# Patient Record
(demographics unavailable — no encounter records)

---

## 2025-01-28 NOTE — PHYSICAL EXAM
[Sclera] : the sclera and conjunctiva were normal [Extraocular Movements] : extraocular movements were intact [Outer Ear] : the ears and nose were normal in appearance [Hearing Threshold Finger Rub Not [place]] : hearing was normal [] : no respiratory distress [Exaggerated Use Of Accessory Muscles For Inspiration] : no accessory muscle use [Arterial Pulses Normal] : the arterial pulses were normal [Edema] : no peripheral edema present [Nondistended] : nondistended [Abdomen Tenderness] : non-tender [Nail Clubbing] : no clubbing  or cyanosis of the fingernails [Normal] : oriented to person, place and time, the affect was normal, the mood was normal and not anxious [Supraclavicular Lymph Nodes Enlarged Bilaterally] : supraclavicular [Axillary Lymph Nodes Enlarged Bilaterally] : axillary [de-identified] : breasts are smooth and supple w/o e/o prior RT effect in the treated right breast; right breast is slightly smaller than the left; dimpling at site of recent biopsy

## 2025-01-28 NOTE — END OF VISIT
[] : Resident [FreeTextEntry3] :   I was present with the resident during the key portions of the history and exam. I discussed the case with the Resident and agree with the findings and plan as documented in the Residents note unless noted below.

## 2025-01-28 NOTE — HISTORY OF PRESENT ILLNESS
[FreeTextEntry1] : Ms. Cortez is a 56-year-old female with recurrent right breast cancer. She has a history of Stage IA right breast cancer (ER/HI+ HER2-) s/p lumpectomy with SLNB and completed WBRT in March 2013. Patient took Tamoxifen for 10 years.   Patient has been getting annual imaging with mammograms and MRI scans.   05/03/24 Bilateral mammogram & ultrasound ([facility]) showed no evidence of malignancy.   12/26/24 Bilateral breasts MRI ([facility]). Right breast showed indeterminant clumped linear non-mass enhancement in the outer right breast. MRI guided core biopsy recommended. No suspicious enhancement in the left breast.   01/17/25 MRI Guided biopsy ([facility]). Pathology revealed a right breast DCIS, intermediate nuclear grade, solid pattern. Surrounding breast tissue showing hyalinized fibroadenomatous nodule, focal sclerosing adenosis and dense stromal fibrosis. ER/HI-   01/28/25 Ms. Cortez presents today to discuss the possibility of reirradiation. She had a follow-up with Dr. Kelley on 1/24/25 and was experiencing new upper breast pain Pt will be having a CT tomorrow to check for any distant recurrence.  She has been seeing Dr Carri Bernabe since 2013 and has an appointment to see her again in April 2025.  Today she is feeling well and offered no new complaints.

## 2025-01-28 NOTE — PHYSICAL EXAM
[Sclera] : the sclera and conjunctiva were normal [Extraocular Movements] : extraocular movements were intact [Outer Ear] : the ears and nose were normal in appearance [Hearing Threshold Finger Rub Not [place]] : hearing was normal [] : no respiratory distress [Exaggerated Use Of Accessory Muscles For Inspiration] : no accessory muscle use [Arterial Pulses Normal] : the arterial pulses were normal [Edema] : no peripheral edema present [Nondistended] : nondistended [Abdomen Tenderness] : non-tender [Nail Clubbing] : no clubbing  or cyanosis of the fingernails [Normal] : oriented to person, place and time, the affect was normal, the mood was normal and not anxious [Supraclavicular Lymph Nodes Enlarged Bilaterally] : supraclavicular [Axillary Lymph Nodes Enlarged Bilaterally] : axillary [de-identified] : breasts are smooth and supple w/o e/o prior RT effect in the treated right breast; right breast is slightly smaller than the left; dimpling at site of recent biopsy

## 2025-02-04 NOTE — REVIEW OF SYSTEMS
[Recent Weight Gain (___ Lbs)] : recent [unfilled] ~Ulb weight gain [Night Sweats] : night sweats [Recent ___ Lb Weight Gain] : recent [unfilled] ~Ulb weight gain [Hot Flashes] : hot flashes

## 2025-02-08 NOTE — CONSULT LETTER
[Dear  ___] : Dear  [unfilled], [Consult Letter:] : I had the pleasure of evaluating your patient, [unfilled]. [Please see my note below.] : Please see my note below. [Consult Closing:] : Thank you very much for allowing me to participate in the care of this patient.  If you have any questions, please do not hesitate to contact me. [Sincerely,] : Sincerely, [FreeTextEntry3] : Sharonda Knowles [place] [address] [DrGustavo  ___] : Dr. LAZARO [DrGustavo ___] : Dr. LAZARO

## 2025-02-08 NOTE — CONSULT LETTER
[Dear  ___] : Dear  [unfilled], [Consult Letter:] : I had the pleasure of evaluating your patient, [unfilled]. [Please see my note below.] : Please see my note below. [Consult Closing:] : Thank you very much for allowing me to participate in the care of this patient.  If you have any questions, please do not hesitate to contact me. [Sincerely,] : Sincerely, [FreeTextEntry3] : Sharonda [place] [address] [DrGustavo  ___] : Dr. LAZARO [DrGustavo ___] : Dr. LAZARO

## 2025-02-08 NOTE — PHYSICAL EXAM
[Normocephalic] : normocephalic [Atraumatic] : atraumatic [Supple] : supple [No Supraclavicular Adenopathy] : no supraclavicular adenopathy [Examined in the supine and seated position] : examined in the supine and seated position [Asymmetrical] : asymmetrical [No dominant masses] : no dominant masses in right breast  [No dominant masses] : no dominant masses left breast [No Nipple Retraction] : no left nipple retraction [No Nipple Discharge] : no left nipple discharge [No Axillary Lymphadenopathy] : no left axillary lymphadenopathy [No Edema] : no edema [No Rashes] : no rashes [No Ulceration] : no ulceration [de-identified] : healed periareolar incision, slight lateral  nipple defect, healed 3:00 radial incision, healed axillary incision, mild brawny induration c/w rtx

## 2025-02-08 NOTE — PHYSICAL EXAM
[Normocephalic] : normocephalic [Atraumatic] : atraumatic [Supple] : supple [No Supraclavicular Adenopathy] : no supraclavicular adenopathy [Examined in the supine and seated position] : examined in the supine and seated position [Asymmetrical] : asymmetrical [No dominant masses] : no dominant masses in right breast  [No dominant masses] : no dominant masses left breast [No Nipple Retraction] : no left nipple retraction [No Nipple Discharge] : no left nipple discharge [No Axillary Lymphadenopathy] : no left axillary lymphadenopathy [No Edema] : no edema [No Rashes] : no rashes [No Ulceration] : no ulceration [de-identified] : healed periareolar incision, slight lateral  nipple defect, healed 3:00 radial incision, healed axillary incision, mild brawny induration c/w rtx

## 2025-02-08 NOTE — HISTORY OF PRESENT ILLNESS
[FreeTextEntry1] : Lorena is a 56-year-old female referred for newly diagnosed ductal carcinoma in situ of the right breast. Her recent bilateral breast MRI done for cancer surveillance (12/27/2024, [facility]). showed an indeterminant clumped linear non-mass enhancement in the right outer breast for which an MRI guided biopsy was recommended. Her most recent screening imaging done on 05/03/2024 ([facility]) showed heterogeneously dense breast tissue with no suspicious findings on neither mammogram nor ultrasound.  Pathology of right 9:00 outer MRI biopsy ([facility], 01/17/2025) showed DCIS, intermediate grade ER negative and WI negative.  Her previous breast history is positive for right invasive ductal carcinoma in 2012. This was an incidental finding of an 8mm IDC on pathology of a right excisional biopsy done for a papilloma. Receptors were ER/[place] positive and her2 negative She underwent a right re-excision partial mastectomy and right sentinel node excision (Dr Jocelynn Guerrier) which showed no residual carcinoma and 0/3 sentinel nodes.  Her oncotype recurrence score was 19 She underwent RTX and 10 years on tamoxifen therapy (Dr Carri Bernabe). 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 She consulted with Dr Kelley who ordered a CT of chest, abd, and pelvis and bone scan.     She does SBE. She has not noticed a change in her breast or a breast lump. She has not noticed a change in her nipple or nipple area. She has not noticed a change in the skin of the breast. She is not experiencing nipple discharge. She is not experiencing breast pain. She has not noticed a lump or lymph node under the armpit.   BREAST CANCER RISK FACTORS Menarche: 12 Date of LMP: 2022 Menopause: 54 Grav: 3     Para: 2 Age at first live birth: 31 Nursed: yes Hysterectomy: no Oophorectomy: no OCP: yes 10 years break back on  HRT: no Last pap/pelvic exam: 12/10/2024 WNL Related family history: Pat aunt x 2 unsure of age  Ashkenazi: no Mastery risk assessment: n/a Genetic testing: VUS POLE updated testing 2023 Bra size:36 B   Last mammogram:  05/03/2024                            Location: [facility] Report reviewed.                                 Images reviewed. Results: Birads 2 Heterogeneously dense No evidence of malignancy.   Last ultrasound:    05/03/2024                               Location: [facility] Report reviewed.                                 Images reviewed. Results: Birads 2 No evidence of malignancy.   Last MRI: 12/27/2024                                            Location: [facility] Report reviewed. Results: Birads 4 Right outer breast indeterminant clumped linear non-mass enhancement. Rec: MRI guided biopsy

## 2025-02-08 NOTE — PHYSICAL EXAM
[Normocephalic] : normocephalic [Atraumatic] : atraumatic [Supple] : supple [No Supraclavicular Adenopathy] : no supraclavicular adenopathy [Examined in the supine and seated position] : examined in the supine and seated position [Asymmetrical] : asymmetrical [No dominant masses] : no dominant masses in right breast  [No dominant masses] : no dominant masses left breast [No Nipple Retraction] : no left nipple retraction [No Nipple Discharge] : no left nipple discharge [No Axillary Lymphadenopathy] : no left axillary lymphadenopathy [No Edema] : no edema [No Rashes] : no rashes [No Ulceration] : no ulceration [de-identified] : healed periareolar incision, slight lateral  nipple defect, healed 3:00 radial incision, healed axillary incision, mild brawny induration c/w rtx

## 2025-02-08 NOTE — CONSULT LETTER
[Dear  ___] : Dear  [unfilled], [Consult Letter:] : I had the pleasure of evaluating your patient, [unfilled]. [Please see my note below.] : Please see my note below. [Consult Closing:] : Thank you very much for allowing me to participate in the care of this patient.  If you have any questions, please do not hesitate to contact me. [Sincerely,] : Sincerely, [DrGustavo  ___] : Dr. LAZARO [FreeTextEntry3] : Sharonda [place] [address] [DrGustavo ___] : Dr. LAZARO

## 2025-02-08 NOTE — PHYSICAL EXAM
[Normocephalic] : normocephalic [Atraumatic] : atraumatic [Supple] : supple [No Supraclavicular Adenopathy] : no supraclavicular adenopathy [Examined in the supine and seated position] : examined in the supine and seated position [Asymmetrical] : asymmetrical [No dominant masses] : no dominant masses in right breast  [No dominant masses] : no dominant masses left breast [No Nipple Retraction] : no left nipple retraction [No Nipple Discharge] : no left nipple discharge [No Axillary Lymphadenopathy] : no left axillary lymphadenopathy [No Edema] : no edema [No Rashes] : no rashes [No Ulceration] : no ulceration [de-identified] : healed periareolar incision, slight lateral  nipple defect, healed 3:00 radial incision, healed axillary incision, mild brawny induration c/w rtx

## 2025-02-08 NOTE — HISTORY OF PRESENT ILLNESS
[FreeTextEntry1] : Lorena is a 56-year-old female referred for newly diagnosed ductal carcinoma in situ of the right breast. Her recent bilateral breast MRI done for cancer surveillance (12/27/2024, [facility]). showed an indeterminant clumped linear non-mass enhancement in the right outer breast for which an MRI guided biopsy was recommended. Her most recent screening imaging done on 05/03/2024 ([facility]) showed heterogeneously dense breast tissue with no suspicious findings on neither mammogram nor ultrasound.  Pathology of right 9:00 outer MRI biopsy ([facility], 01/17/2025) showed DCIS, intermediate grade ER negative and IN negative.  Her previous breast history is positive for right invasive ductal carcinoma in 2012. This was an incidental finding of an 8mm IDC on pathology of a right excisional biopsy done for a papilloma. Receptors were ER/[place] positive and her2 negative She underwent a right re-excision partial mastectomy and right sentinel node excision (Dr Jocelynn Guerrier) which showed no residual carcinoma and 0/3 sentinel nodes.  Her oncotype recurrence score was 19 She underwent RTX and 10 years on tamoxifen therapy (Dr Carri Bernabe). 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 She consulted with Dr Kelley who ordered a CT of chest, abd, and pelvis and bone scan.     She does SBE. She has not noticed a change in her breast or a breast lump. She has not noticed a change in her nipple or nipple area. She has not noticed a change in the skin of the breast. She is not experiencing nipple discharge. She is not experiencing breast pain. She has not noticed a lump or lymph node under the armpit.   BREAST CANCER RISK FACTORS Menarche: 12 Date of LMP: 2022 Menopause: 54 Grav: 3     Para: 2 Age at first live birth: 31 Nursed: yes Hysterectomy: no Oophorectomy: no OCP: yes 10 years break back on  HRT: no Last pap/pelvic exam: 12/10/2024 WNL Related family history: Pat aunt x 2 unsure of age  Ashkenazi: no Mastery risk assessment: n/a Genetic testing: VUS POLE updated testing 2023 Bra size:36 B   Last mammogram:  05/03/2024                            Location: [facility] Report reviewed.                                 Images reviewed. Results: Birads 2 Heterogeneously dense No evidence of malignancy.   Last ultrasound:    05/03/2024                               Location: [facility] Report reviewed.                                 Images reviewed. Results: Birads 2 No evidence of malignancy.   Last MRI: 12/27/2024                                            Location: [facility] Report reviewed. Results: Birads 4 Right outer breast indeterminant clumped linear non-mass enhancement. Rec: MRI guided biopsy

## 2025-02-08 NOTE — HISTORY OF PRESENT ILLNESS
[FreeTextEntry1] : Lorena is a 56-year-old female referred for newly diagnosed ductal carcinoma in situ of the right breast. Her recent bilateral breast MRI done for cancer surveillance (12/27/2024, [facility]). showed an indeterminant clumped linear non-mass enhancement in the right outer breast for which an MRI guided biopsy was recommended. Her most recent screening imaging done on 05/03/2024 ([facility]) showed heterogeneously dense breast tissue with no suspicious findings on neither mammogram nor ultrasound.  Pathology of right 9:00 outer MRI biopsy ([facility], 01/17/2025) showed DCIS, intermediate grade ER negative and TN negative.  Her previous breast history is positive for right invasive ductal carcinoma in 2012. This was an incidental finding of an 8mm IDC on pathology of a right excisional biopsy done for a papilloma. Receptors were ER/TN positive and her2 negative She underwent a right re-excision partial mastectomy and right sentinel node excision (Dr Jocelynn Guerrier) which showed no residual carcinoma and 0/3 sentinel nodes.  Her oncotype recurrence score was 19 She underwent RTX and 10 years on tamoxifen therapy (Dr Carri Bernabe). 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 She consulted with Dr Kelley who ordered a CT of chest, abd, and pelvis and bone scan.     She does SBE. She has not noticed a change in her breast or a breast lump. She has not noticed a change in her nipple or nipple area. She has not noticed a change in the skin of the breast. She is not experiencing nipple discharge. She is not experiencing breast pain. She has not noticed a lump or lymph node under the armpit.   BREAST CANCER RISK FACTORS Menarche: 12 Date of LMP: 2022 Menopause: 54 Grav: 3     Para: 2 Age at first live birth: 31 Nursed: yes Hysterectomy: no Oophorectomy: no OCP: yes 10 years break back on  HRT: no Last pap/pelvic exam: 12/10/2024 WNL Related family history: Pat aunt x 2 unsure of age  Ashkenazi: no Mastery risk assessment: n/a Genetic testing: VUS POLE updated testing 2023 Bra size:36 B   Last mammogram:  05/03/2024                            Location: [facility] Report reviewed.                                 Images reviewed. Results: Birads 2 Heterogeneously dense No evidence of malignancy.   Last ultrasound:    05/03/2024                               Location: [facility] Report reviewed.                                 Images reviewed. Results: Birads 2 No evidence of malignancy.   Last MRI: 12/27/2024                                            Location: [facility] Report reviewed. Results: Birads 4 Right outer breast indeterminant clumped linear non-mass enhancement. Rec: MRI guided biopsy

## 2025-02-08 NOTE — CONSULT LETTER
[Dear  ___] : Dear  [unfilled], [Consult Letter:] : I had the pleasure of evaluating your patient, [unfilled]. [Please see my note below.] : Please see my note below. [Consult Closing:] : Thank you very much for allowing me to participate in the care of this patient.  If you have any questions, please do not hesitate to contact me. [Sincerely,] : Sincerely, [FreeTextEntry3] : Sharonda [place] [address] [DrGustavo  ___] : Dr. LAZARO [DrGustavo ___] : Dr. LAZARO

## 2025-02-08 NOTE — ASSESSMENT
[FreeTextEntry1] : The pathology and imaging results were discussed. breast MRI  was reviewed. CT scan was reviewed, her bone scan is scheduled 2/6/2025.  The use of multimodality therapy for the treatment of breast cancer was discussed. The surgical options include a lumpectomy to negative margins with radiation therapy versus a mastectomy with or without reconstruction. Mastectomy techniques were discussed in detail including skin sparing, areolar sparing and nipple sparing techniques. Long term follow up of nipple sparing techniques are not yet available. The risks for nipple loss and high likelihood of sensation loss were discussed. The intranipple tissue would be examined pathologically and if any cancer of abnormal cells were found, a subsequent nipple excision would be recommended. Nipple delay procedure was reviewed which would occur 2 weeks prior to her mastectomy.  Reconstructive options were discussed with the risks and benefits to each. Implant based reconstruction with expander versus direct to implant techniques with/without alloderm were discussed as well as tissue flap procedures. Reconstruction must be covered under state and federal laws. Referral to a reconstructive surgeon was offered. She has met with Dr. Barksdale but has unanswered questions. I feel she would benefit from another consultation.  Axillary staging is not generally necessary for DCIS. There are certain situations where it may be advisable to evaluate for axillary radha spread. These include palpable DCIS, extensive DCIS especially with comedo necrosis (high grade), or when a mastectomy is planned. The reasoning being that there is a higher chance of finding invasive disease in these circumstances or that when a mastectomy is performed, the sentinel node biopsy must be performed at the same time since the surgery disrupts the lymphatic flow and subsequent sentinel node evaluation would not be accurate. If the sentinel lymph node is found to have metastatic disease either on the intra operative evaluation or on the final pathology, an axillary dissection may be recommended. There is evidence that it may be necessary to complete an axillary dissection if one or two sentinel nodes are positive if the invasive cancer is less than 5 cm and treated by lumpectomy and conventional tangential field radiation. The risks and benefits of surgery were discussed. Magtrace injection was reviewed. Discussed if microinvasive or invasive cancer is found on pathology then sentinel lymph node biopsy may be recommended.  The general indications for the use of chemotherapy were discussed but is dependent on the pathology results. Chemotherapy would not be indicated for DCIS. Hormonal therapy may be advised if the disease is ER+. Not only can it help prevent recurrence of the already diagnosed cancer, it can help reduce the risk of a new primary tumor. The patient will need to meet with a medical oncologist once those results of surgery are available. The indications for radiation therapy and side effects were also discussed including use of accelerated and partial breast techniques. The patient will need to meet with a radiation therapist if radiation is recommended. The genetics of breast cancer were discussed with the implications for risk contralateral cancer and other associated cancers, options for management, and implications for family members.  I will discuss her case with Dr. Bernabe. She met with our cancer navigator and was given a binder. She is motivated towards bilateral NSM and immediate autolgous reconstruction.  She will let us know how she would like to proceed after her bone scan. We discussed getting mammogram before her mastectomy. Or we discussed bilateral magtrace injection as an alternative.  She knows to call or return sooner should any concerns or questions arise.

## 2025-02-08 NOTE — HISTORY OF PRESENT ILLNESS
[FreeTextEntry1] : Lorena is a 56-year-old female referred for newly diagnosed ductal carcinoma in situ of the right breast. Her recent bilateral breast MRI done for cancer surveillance (12/27/2024, [facility]). showed an indeterminant clumped linear non-mass enhancement in the right outer breast for which an MRI guided biopsy was recommended. Her most recent screening imaging done on 05/03/2024 ([facility]) showed heterogeneously dense breast tissue with no suspicious findings on neither mammogram nor ultrasound.  Pathology of right 9:00 outer MRI biopsy ([facility], 01/17/2025) showed DCIS, intermediate grade ER negative and ND negative.  Her previous breast history is positive for right invasive ductal carcinoma in 2012. This was an incidental finding of an 8mm IDC on pathology of a right excisional biopsy done for a papilloma. Receptors were ER/ND positive and her2 negative She underwent a right re-excision partial mastectomy and right sentinel node excision (Dr Jocelynn Guerrier) which showed no residual carcinoma and 0/3 sentinel nodes.  Her oncotype recurrence score was 19 She underwent RTX and 10 years on tamoxifen therapy (Dr Carri Bernabe). 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 She consulted with Dr Kelley who ordered a CT of chest, abd, and pelvis and bone scan.     She does SBE. She has not noticed a change in her breast or a breast lump. She has not noticed a change in her nipple or nipple area. She has not noticed a change in the skin of the breast. She is not experiencing nipple discharge. She is not experiencing breast pain. She has not noticed a lump or lymph node under the armpit.   BREAST CANCER RISK FACTORS Menarche: 12 Date of LMP: 2022 Menopause: 54 Grav: 3     Para: 2 Age at first live birth: 31 Nursed: yes Hysterectomy: no Oophorectomy: no OCP: yes 10 years break back on  HRT: no Last pap/pelvic exam: 12/10/2024 WNL Related family history: Pat aunt x 2 unsure of age  Ashkenazi: no Mastery risk assessment: n/a Genetic testing: VUS POLE updated testing 2023 Bra size:36 B   Last mammogram:  05/03/2024                            Location: [facility] Report reviewed.                                 Images reviewed. Results: Birads 2 Heterogeneously dense No evidence of malignancy.   Last ultrasound:    05/03/2024                               Location: [facility] Report reviewed.                                 Images reviewed. Results: Birads 2 No evidence of malignancy.   Last MRI: 12/27/2024                                            Location: [facility] Report reviewed. Results: Birads 4 Right outer breast indeterminant clumped linear non-mass enhancement. Rec: MRI guided biopsy

## 2025-02-08 NOTE — CONSULT LETTER
[Dear  ___] : Dear  [unfilled], [Consult Letter:] : I had the pleasure of evaluating your patient, [unfilled]. [Please see my note below.] : Please see my note below. [Consult Closing:] : Thank you very much for allowing me to participate in the care of this patient.  If you have any questions, please do not hesitate to contact me. [Sincerely,] : Sincerely, [DrGustavo  ___] : Dr. LAZARO [FreeTextEntry3] : Sharonda [place]les [place] [address] [DrGustavo ___] : Dr. LAZARO

## 2025-02-08 NOTE — HISTORY OF PRESENT ILLNESS
[FreeTextEntry1] : Lorena is a 56-year-old female referred for newly diagnosed ductal carcinoma in situ of the right breast. Her recent bilateral breast MRI done for cancer surveillance (12/27/2024, [facility]). showed an indeterminant clumped linear non-mass enhancement in the right outer breast for which an MRI guided biopsy was recommended. Her most recent screening imaging done on 05/03/2024 ([facility]) showed heterogeneously dense breast tissue with no suspicious findings on neither mammogram nor ultrasound.  Pathology of right 9:00 outer MRI biopsy ([facility], 01/17/2025) showed DCIS, intermediate grade ER negative and UT negative.  Her previous breast history is positive for right invasive ductal carcinoma in 2012. This was an incidental finding of an 8mm IDC on pathology of a right excisional biopsy done for a papilloma. Receptors were ER/UT positive and her2 negative She underwent a right re-excision partial mastectomy and right sentinel node excision (Dr Jocelynn Guerrier) which showed no residual carcinoma and 0/3 sentinel nodes.  Her oncotype recurrence score was 19 She underwent RTX and 10 years on tamoxifen therapy (Dr Carri Bernabe). 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 She consulted with Dr Kelley who ordered a CT of chest, abd, and pelvis and bone scan.     She does SBE. She has not noticed a change in her breast or a breast lump. She has not noticed a change in her nipple or nipple area. She has not noticed a change in the skin of the breast. She is not experiencing nipple discharge. She is not experiencing breast pain. She has not noticed a lump or lymph node under the armpit.   BREAST CANCER RISK FACTORS Menarche: 12 Date of LMP: 2022 Menopause: 54 Grav: 3     Para: 2 Age at first live birth: 31 Nursed: yes Hysterectomy: no Oophorectomy: no OCP: yes 10 years break back on  HRT: no Last pap/pelvic exam: 12/10/2024 WNL Related family history: Pat aunt x 2 unsure of age  Ashkenazi: no Mastery risk assessment: n/a Genetic testing: VUS POLE updated testing 2023 Bra size:36 B   Last mammogram:  05/03/2024                            Location: [facility] Report reviewed.                                 Images reviewed. Results: Birads 2 Heterogeneously dense No evidence of malignancy.   Last ultrasound:    05/03/2024                               Location: [facility] Report reviewed.                                 Images reviewed. Results: Birads 2 No evidence of malignancy.   Last MRI: 12/27/2024                                            Location: [facility] Report reviewed. Results: Birads 4 Right outer breast indeterminant clumped linear non-mass enhancement. Rec: MRI guided biopsy

## 2025-02-10 NOTE — HISTORY OF PRESENT ILLNESS
[FreeTextEntry1] : Ms. Augustin presents for initial consultation for breast reconstruction at the time of B/L mastectomy, referred by Dr. Kelley. Recently diagnosed with recurrent right breast cancer, has had right breast cancer in the past with segmental resection and radiation. Size B cup. She works in marketing.  H significant for breast surgery and umbilical hernia repair  B/L breasts soft, right smaller than left because of prior segmental resection and radiation Abdomen soft, non-tender, non-distended, adequate tissue for b/l LIZ flap reconstruction   Plan: Today we reviewed all options for breast reconstruction including implant and autologous options. Nature of each surgery, its risks, benefits, alternatives, expected postoperative course, recovery and long term results were reviewed. Staged nature of surgery, with a planned revision phase reviewed. Risks specific to microsurgical tissue transfer including partial or total flap loss were reviewed. All questions answered. Ms. AUGUSTIN expressed understanding and agreed to proceed. Risks related to prior umbilical hernia repair reviewed. Will obtain preoperative CT angiogram to map vascular perforators and evaluate scope of prior hernia repair surgery. Will coordinate surgery for the near future.

## 2025-02-10 NOTE — HISTORY OF PRESENT ILLNESS
[FreeTextEntry1] : Ms. Augustin presents for follow up for breast reconstruction at the time of B/L mastectomy, referred by Dr. Kelley. Recently diagnosed with recurrent right breast cancer, has had right breast cancer in the past with segmental resection and radiation. Size B cup. She works in marketing.  H significant for breast surgery and umbilical hernia repair  B/L breasts soft, right smaller than left because of prior segmental resection and radiation Abdomen soft, non-tender, non-distended, adequate tissue for b/l LIZ flap reconstruction   Plan: Today we reviewed all options for breast reconstruction including implant and autologous options. Nature of each surgery, its risks, benefits, alternatives, expected postoperative course, recovery and long term results were reviewed. Staged nature of surgery, with a planned revision phase reviewed. Risks specific to microsurgical tissue transfer including partial or total flap loss were reviewed. All questions answered. Ms. AUGUSTIN expressed understanding and agreed to proceed. Risks related to prior umbilical hernia repair reviewed. Will obtain preoperative CT angiogram to map vascular perforators and evaluate scope of prior hernia repair surgery. Will coordinate surgery for the near future.

## 2025-02-21 NOTE — PHYSICAL EXAM
[Normocephalic] : normocephalic [Atraumatic] : atraumatic [Supple] : supple [No Supraclavicular Adenopathy] : no supraclavicular adenopathy [Examined in the supine and seated position] : examined in the supine and seated position [Asymmetrical] : asymmetrical [No dominant masses] : no dominant masses in right breast  [No dominant masses] : no dominant masses left breast [No Nipple Retraction] : no left nipple retraction [No Nipple Discharge] : no left nipple discharge [No Axillary Lymphadenopathy] : no left axillary lymphadenopathy [No Edema] : no edema [No Rashes] : no rashes [No Ulceration] : no ulceration [de-identified] : healed periareolar incision, slight lateral  nipple defect, healed 3:00 radial incision, healed axillary incision, mild brawny induration c/w rtx

## 2025-02-21 NOTE — PHYSICAL EXAM
[Normocephalic] : normocephalic [Atraumatic] : atraumatic [Supple] : supple [No Supraclavicular Adenopathy] : no supraclavicular adenopathy [Examined in the supine and seated position] : examined in the supine and seated position [Asymmetrical] : asymmetrical [No dominant masses] : no dominant masses in right breast  [No dominant masses] : no dominant masses left breast [No Nipple Retraction] : no left nipple retraction [No Nipple Discharge] : no left nipple discharge [No Axillary Lymphadenopathy] : no left axillary lymphadenopathy [No Edema] : no edema [No Rashes] : no rashes [No Ulceration] : no ulceration [de-identified] : healed periareolar incision, slight lateral  nipple defect, healed 3:00 radial incision, healed axillary incision, mild brawny induration c/w rtx

## 2025-02-21 NOTE — ASSESSMENT
Addendum  created 09/30/24 1104 by Melissa Cerrato MD    Clinical Note Signed       [FreeTextEntry1] : doing well path reviewed copy given plan mx as scheduled  She knows to call or return sooner should any concerns or questions arise.

## 2025-02-21 NOTE — CONSULT LETTER
[Dear  ___] : Dear  [unfilled], [Consult Letter:] : I had the pleasure of evaluating your patient, [unfilled]. [Please see my note below.] : Please see my note below. [Consult Closing:] : Thank you very much for allowing me to participate in the care of this patient.  If you have any questions, please do not hesitate to contact me. [Sincerely,] : Sincerely, [DrGustavo  ___] : Dr. LAZARO [DrGustavo ___] : Dr. LAZARO [FreeTextEntry3] : Sharonda Knowles [place] [address]

## 2025-02-21 NOTE — HISTORY OF PRESENT ILLNESS
[FreeTextEntry1] : Lorena is a 56-year-old female referred for newly diagnosed ductal carcinoma in situ of the right breast. Her recent bilateral breast MRI done for cancer surveillance (12/27/2024, [facility]). showed an indeterminant clumped linear non-mass enhancement in the right outer breast for which an MRI guided biopsy was recommended. Her most recent screening imaging done on 05/03/2024 ([facility]) showed heterogeneously dense breast tissue with no suspicious findings on neither mammogram nor ultrasound.  Pathology of right 9:00 outer MRI biopsy ([facility], 01/17/2025) showed DCIS, intermediate grade ER negative and VT negative.  Her previous breast history is positive for right invasive ductal carcinoma in 2012. This was an incidental finding of an 8mm IDC on pathology of a right excisional biopsy done for a papilloma. Receptors were ER/VT positive and her2 negative She underwent a right re-excision partial mastectomy and right sentinel node excision (Dr Jocelynn Guerrier) which showed no residual carcinoma and 0/3 sentinel nodes.  Her oncotype recurrence score was 19 She underwent RTX and 10 years on tamoxifen therapy (Dr Carri Bernabe). 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 She consulted with Dr Kelley who ordered a CT of chest, abd, and pelvis and bone scan.   CT of Ch, Abd, Pelvis (01/29/2025, [facility]) showed small 0.3 cm nodule in the apex of right lung, felt to be likely inflammatory in nature. a 1.5 cm region of sclerosis adjacent to L4 vertebral body endplate. Appears stable since MRI 3/2022 which bone scan (02/06/2025, [facility]) showing no evidence of bony metastatic disease. No follow up imaging was recommended.   Pt is now s/p bilateral nipple delay on 02/13/2025. Surgical pathology of right nipple showed benign breast tissue. Surgical pathology of left nipple showed benign breast tissue. Pt is scheduled for bilateral nipple sparing mastectomy with LIZ flap reconstruction on 3/5/2025.   She does SBE. She has not noticed a change in her breast or a breast lump. She has not noticed a change in her nipple or nipple area. She has not noticed a change in the skin of the breast. She is not experiencing nipple discharge. She is not experiencing breast pain. She has not noticed a lump or lymph node under the armpit.   BREAST CANCER RISK FACTORS Menarche: 12 Date of LMP: 2022 Menopause: 54 Grav: 3     Para: 2 Age at first live birth: 31 Nursed: yes Hysterectomy: no Oophorectomy: no OCP: yes 10 years break back on  HRT: no Last pap/pelvic exam: 12/10/2024 WNL Related family history: Pat aunt x 2 unsure of age  Ashkenazi: no Mastery risk assessment: n/a Genetic testing: VUS POLE updated testing 2023 Bra size:36 B   Last mammogram:  02/07/2025                          Location: [facility] Report reviewed.                                 Images reviewed. Results: Birads 6  Heterogeneously dense, known biopsy proven malignancy    Last ultrasound:    05/03/2024                               Location: [facility] Report reviewed.                                 Images reviewed. Results: Birads 2 No evidence of malignancy.   Last MRI: 12/27/2024                                            Location: [facility] Report reviewed. Results: Birads 4 Right outer breast indeterminant clumped linear non-mass enhancement. Rec: MRI guided biopsy

## 2025-02-21 NOTE — CONSULT LETTER
[Dear  ___] : Dear  [unfilled], [Consult Letter:] : I had the pleasure of evaluating your patient, [unfilled]. [Please see my note below.] : Please see my note below. [Consult Closing:] : Thank you very much for allowing me to participate in the care of this patient.  If you have any questions, please do not hesitate to contact me. [Sincerely,] : Sincerely, [DrGustavo  ___] : Dr. LAZARO [DrGustavo ___] : Dr. LAZARO [FreeTextEntry3] : Sharonda Knowles MS [place] [address]

## 2025-02-21 NOTE — CONSULT LETTER
[Dear  ___] : Dear  [unfilled], [Consult Letter:] : I had the pleasure of evaluating your patient, [unfilled]. [Please see my note below.] : Please see my note below. [Consult Closing:] : Thank you very much for allowing me to participate in the care of this patient.  If you have any questions, please do not hesitate to contact me. [Sincerely,] : Sincerely, [DrGustavo  ___] : Dr. LAZARO [DrGustavo ___] : Dr. LAZARO [FreeTextEntry3] : Sharonda [place]les [place] [address]

## 2025-02-21 NOTE — HISTORY OF PRESENT ILLNESS
[FreeTextEntry1] : Lorena is a 56-year-old female referred for newly diagnosed ductal carcinoma in situ of the right breast. Her recent bilateral breast MRI done for cancer surveillance (12/27/2024, [facility]). showed an indeterminant clumped linear non-mass enhancement in the right outer breast for which an MRI guided biopsy was recommended. Her most recent screening imaging done on 05/03/2024 ([facility]) showed heterogeneously dense breast tissue with no suspicious findings on neither mammogram nor ultrasound.  Pathology of right 9:00 outer MRI biopsy ([facility], 01/17/2025) showed DCIS, intermediate grade ER negative and ND negative.  Her previous breast history is positive for right invasive ductal carcinoma in 2012. This was an incidental finding of an 8mm IDC on pathology of a right excisional biopsy done for a papilloma. Receptors were ER/ND positive and her2 negative She underwent a right re-excision partial mastectomy and right sentinel node excision (Dr Jocelynn Guerrier) which showed no residual carcinoma and 0/3 sentinel nodes.  Her oncotype recurrence score was 19 She underwent RTX and 10 years on tamoxifen therapy (Dr Carri Bernabe). 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 She consulted with Dr Kelley who ordered a CT of chest, abd, and pelvis and bone scan.   CT of Ch, Abd, Pelvis (01/29/2025, [facility]) showed small 0.3 cm nodule in the apex of right lung, felt to be likely inflammatory in nature. a 1.5 cm region of sclerosis adjacent to L4 vertebral body endplate. Appears stable since MRI 3/2022 which bone scan (02/06/2025, [facility]) showing no evidence of bony metastatic disease. No follow up imaging was recommended.   Pt is now s/p bilateral nipple delay on 02/13/2025. Surgical pathology of right nipple showed benign breast tissue. Surgical pathology of left nipple showed benign breast tissue. Pt is scheduled for bilateral nipple sparing mastectomy with LIZ flap reconstruction on 3/5/2025.   She does SBE. She has not noticed a change in her breast or a breast lump. She has not noticed a change in her nipple or nipple area. She has not noticed a change in the skin of the breast. She is not experiencing nipple discharge. She is not experiencing breast pain. She has not noticed a lump or lymph node under the armpit.   BREAST CANCER RISK FACTORS Menarche: 12 Date of LMP: 2022 Menopause: 54 Grav: 3     Para: 2 Age at first live birth: 31 Nursed: yes Hysterectomy: no Oophorectomy: no OCP: yes 10 years break back on  HRT: no Last pap/pelvic exam: 12/10/2024 WNL Related family history: Pat aunt x 2 unsure of age  Ashkenazi: no Mastery risk assessment: n/a Genetic testing: VUS POLE updated testing 2023 Bra size:36 B   Last mammogram:  02/07/2025                          Location: [facility] Report reviewed.                                 Images reviewed. Results: Birads 6  Heterogeneously dense, known biopsy proven malignancy    Last ultrasound:    05/03/2024                               Location: [facility] Report reviewed.                                 Images reviewed. Results: Birads 2 No evidence of malignancy.   Last MRI: 12/27/2024                                            Location: [facility] Report reviewed. Results: Birads 4 Right outer breast indeterminant clumped linear non-mass enhancement. Rec: MRI guided biopsy

## 2025-02-21 NOTE — PHYSICAL EXAM
[Normocephalic] : normocephalic [Atraumatic] : atraumatic [Supple] : supple [No Supraclavicular Adenopathy] : no supraclavicular adenopathy [Examined in the supine and seated position] : examined in the supine and seated position [Asymmetrical] : asymmetrical [No dominant masses] : no dominant masses in right breast  [No dominant masses] : no dominant masses left breast [No Nipple Retraction] : no left nipple retraction [No Nipple Discharge] : no left nipple discharge [No Axillary Lymphadenopathy] : no left axillary lymphadenopathy [No Edema] : no edema [No Rashes] : no rashes [No Ulceration] : no ulceration [de-identified] : healed periareolar incision, slight lateral  nipple defect, healed 3:00 radial incision, healed axillary incision, mild brawny induration c/w rtx

## 2025-02-21 NOTE — HISTORY OF PRESENT ILLNESS
[FreeTextEntry1] : Lorena is a 56-year-old female referred for newly diagnosed ductal carcinoma in situ of the right breast. Her recent bilateral breast MRI done for cancer surveillance (12/27/2024, [facility]). showed an indeterminant clumped linear non-mass enhancement in the right outer breast for which an MRI guided biopsy was recommended. Her most recent screening imaging done on 05/03/2024 ([facility]) showed heterogeneously dense breast tissue with no suspicious findings on neither mammogram nor ultrasound.  Pathology of right 9:00 outer MRI biopsy ([facility], 01/17/2025) showed DCIS, intermediate grade ER negative and RI negative.  Her previous breast history is positive for right invasive ductal carcinoma in 2012. This was an incidental finding of an 8mm IDC on pathology of a right excisional biopsy done for a papilloma. Receptors were ER/RI positive and her2 negative She underwent a right re-excision partial mastectomy and right sentinel node excision (Dr Jocelynn Guerrier) which showed no residual carcinoma and 0/3 sentinel nodes.  Her oncotype recurrence score was 19 She underwent RTX and 10 years on tamoxifen therapy (Dr Carri Bernabe). 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 She consulted with Dr Kelley who ordered a CT of chest, abd, and pelvis and bone scan.   CT of Ch, Abd, Pelvis (01/29/2025, [facility]) showed small 0.3 cm nodule in the apex of right lung, felt to be likely inflammatory in nature. a 1.5 cm region of sclerosis adjacent to L4 vertebral body endplate. Appears stable since MRI 3/2022 which bone scan (02/06/2025, [facility]) showing no evidence of bony metastatic disease. No follow up imaging was recommended.   Pt is now s/p bilateral nipple delay on 02/13/2025. Surgical pathology of right nipple showed benign breast tissue. Surgical pathology of left nipple showed benign breast tissue. Pt is scheduled for bilateral nipple sparing mastectomy with LIZ flap reconstruction on 3/5/2025.   She does SBE. She has not noticed a change in her breast or a breast lump. She has not noticed a change in her nipple or nipple area. She has not noticed a change in the skin of the breast. She is not experiencing nipple discharge. She is not experiencing breast pain. She has not noticed a lump or lymph node under the armpit.   BREAST CANCER RISK FACTORS Menarche: 12 Date of LMP: 2022 Menopause: 54 Grav: 3     Para: 2 Age at first live birth: 31 Nursed: yes Hysterectomy: no Oophorectomy: no OCP: yes 10 years break back on  HRT: no Last pap/pelvic exam: 12/10/2024 WNL Related family history: Pat aunt x 2 unsure of age  Ashkenazi: no Mastery risk assessment: n/a Genetic testing: VUS POLE updated testing 2023 Bra size:36 B   Last mammogram:  02/07/2025                          Location: [facility] Report reviewed.                                 Images reviewed. Results: Birads 6  Heterogeneously dense, known biopsy proven malignancy    Last ultrasound:    05/03/2024                               Location: [facility] Report reviewed.                                 Images reviewed. Results: Birads 2 No evidence of malignancy.   Last MRI: 12/27/2024                                            Location: [facility] Report reviewed. Results: Birads 4 Right outer breast indeterminant clumped linear non-mass enhancement. Rec: MRI guided biopsy

## 2025-02-21 NOTE — ASSESSMENT
[FreeTextEntry1] : doing well path reviewed copy given plan mx as scheduled  She knows to call or return sooner should any concerns or questions arise.

## 2025-03-18 NOTE — SURGICAL HISTORY
[de-identified] : 03/05/25: bilateral LIZ flap reconstruction with reinforcement of abdominal donor site with mesh and axogen nerve grafting

## 2025-03-18 NOTE — HISTORY OF PRESENT ILLNESS
[FreeTextEntry1] : 57 y/o female presents s/p bilateral LIZ flap reconstruction on 03/05/2025. Denies f/c/n/v, no complaints, feels well.   B/L breasts soft, incisions healing well, flaps warm with good color and capillary refill NACs viable, no infections or collections Abdomen soft, non-tender, non-distended, incision healing well, umbo viable  Left abdominal drain removed, No infections or collections  Doppler wires removed  Plan: -post op instructions reviewed  -activity restrictions reviewed  -silicone scar treatment reviewed  -f/u in 2 months  1

## 2025-03-18 NOTE — SURGICAL HISTORY
[de-identified] : 03/05/25: bilateral LIZ flap reconstruction with reinforcement of abdominal donor site with mesh and axogen nerve grafting

## 2025-03-18 NOTE — HISTORY OF PRESENT ILLNESS
[FreeTextEntry1] : 55 y/o female presents 6 days s/p bilateral LIZ flap reconstruction with reinforcement of abdominal donor site with mesh and axogen nerve grafting on 03/05/2025. Healing well. No complaints, no events. Flaps viable Breast DEMARCUS drains, and right abdominal drain removed. Wound care reviewed. Follow up in 1 week for remaining drain removal.

## 2025-03-18 NOTE — REASON FOR VISIT
Depression Screening Entered On:  10/20/2021 11:11 AM CDT    Performed On:  10/20/2021 11:10 AM CDT by Tanya Bobby LPN               Depression Screening   Little Interest - Pleasure in Activities :   Not at all   Feeling Down, Depressed, Hopeless :   Several days   Initial Depression Screen Score :   1 Score   Poor Appetite or Overeating :   Nearly every day   Trouble Falling or Staying Asleep :   More than half the days   Feeling Tired or Little Energy :   More than half the days   Feeling Bad About Yourself :   Not at all   Trouble Concentrating :   Several days   Moving or Speaking Slowly :   Not at all   Thoughts Better Off Dead or Hurting Self :   Not at all   Difficulty at Work, Home, Getting Along :   Somewhat difficult   Detailed Depression Screen Score :   8    Total Depression Screen Score :   9    Tanya Bobby LPN - 10/20/2021 11:10 AM CDT   [Post Op: _________] : a [unfilled] post op visit [FreeTextEntry1] : Dr. Kelley

## 2025-03-18 NOTE — SURGICAL HISTORY
[de-identified] : 03/05/25: bilateral LIZ flap reconstruction with reinforcement of abdominal donor site with mesh and axogen nerve grafting

## 2025-03-18 NOTE — HISTORY OF PRESENT ILLNESS
[FreeTextEntry1] : 55 y/o female presents s/p bilateral LIZ flap reconstruction on 03/05/2025. Denies f/c/n/v, no complaints, feels well.   B/L breasts soft, incisions healing well, flaps warm with good color and capillary refill NACs viable, no infections or collections Abdomen soft, non-tender, non-distended, incision healing well, umbo viable  Left abdominal drain removed, No infections or collections  Doppler wires removed  Plan: -post op instructions reviewed  -activity restrictions reviewed  -silicone scar treatment reviewed  -f/u in 2 months

## 2025-03-18 NOTE — HISTORY OF PRESENT ILLNESS
[FreeTextEntry1] : 57 y/o female presents 6 days s/p bilateral LIZ flap reconstruction with reinforcement of abdominal donor site with mesh and axogen nerve grafting on 03/05/2025. Healing well. No complaints, no events. Flaps viable Breast DEMARCUS drains, and right abdominal drain removed. Wound care reviewed. Follow up in 1 week for remaining drain removal.

## 2025-03-18 NOTE — SURGICAL HISTORY
[de-identified] : 03/05/25: bilateral LIZ flap reconstruction with reinforcement of abdominal donor site with mesh and axogen nerve grafting

## 2025-04-01 NOTE — HISTORY OF PRESENT ILLNESS
[FreeTextEntry1] : Lorena is a 56-year-old female referred for newly diagnosed ductal carcinoma in situ of the right breast. Her recent bilateral breast MRI done for cancer surveillance (12/27/2024, [facility]). showed an indeterminant clumped linear non-mass enhancement in the right outer breast for which an MRI guided biopsy was recommended. Her most recent screening imaging done on 05/03/2024 ([facility]) showed heterogeneously dense breast tissue with no suspicious findings on neither mammogram nor ultrasound.  Pathology of right 9:00 outer MRI biopsy ([facility], 01/17/2025) showed DCIS, intermediate grade ER negative and WI negative.  Her previous breast history is positive for right invasive ductal carcinoma in 2012. This was an incidental finding of an 8mm IDC on pathology of a right excisional biopsy done for a papilloma. Receptors were ER/WI positive and her2 negative She underwent a right re-excision partial mastectomy and right sentinel node excision (Dr Jocelynn Guerrier) which showed no residual carcinoma and 0/3 sentinel nodes.  Her oncotype recurrence score was 19 She underwent RTX and 10 years on tamoxifen therapy (Dr Carri Bernabe). 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 She consulted with Dr Kelley who ordered a CT of chest, abd, and pelvis and bone scan.   CT of Ch, Abd, Pelvis (01/29/2025, [facility]) showed small 0.3 cm nodule in the apex of right lung, felt to be likely inflammatory in nature. a 1.5 cm region of sclerosis adjacent to L4 vertebral body endplate. Appears stable since MRI 3/2022 which bone scan (02/06/2025, [facility]) showing no evidence of bony metastatic disease. No follow up imaging was recommended.   Pt is s/p bilateral nipple delay on 02/13/2025. Surgical pathology of right nipple showed benign breast tissue. Surgical pathology of left nipple showed benign breast tissue.   She is s/p bilateral nipple sparing mastectomy with LIZ flap reconstruction on 3/5/2025. Final pathology reported as right DCIS 4mm with focal lobular extension and left benign breast tissue.   She presents for her post op appt. She is feeling well.   She does SBE. She has not noticed a change in her breast or a breast lump. She has not noticed a change in her nipple or nipple area. She has not noticed a change in the skin of the breast. She is not experiencing nipple discharge. She is not experiencing breast pain. She has not noticed a lump or lymph node under the armpit.   BREAST CANCER RISK FACTORS Menarche: 12 Date of LMP: 2022 Menopause: 54 Grav: 3     Para: 2 Age at first live birth: 31 Nursed: yes Hysterectomy: no Oophorectomy: no OCP: yes 10 years break back on  HRT: no Last pap/pelvic exam: 12/10/2024 WNL Related family history: Pat aunt x 2 unsure of age  Ashkenazi: no Mastery risk assessment: n/a Genetic testing: VUS POLE updated testing 2023 Bra size:36 B   Last mammogram:  02/07/2025                          Location: [facility] Report reviewed.                                 Images reviewed. Results: Birads 6  Heterogeneously dense, known biopsy proven malignancy    Last ultrasound:    05/03/2024                               Location: [facility] Report reviewed.                                 Images reviewed. Results: Birads 2 No evidence of malignancy.   Last MRI: 12/27/2024                                            Location: [facility] Report reviewed. Results: Birads 4 Right outer breast indeterminant clumped linear non-mass enhancement. Rec: MRI guided biopsy

## 2025-04-01 NOTE — HISTORY OF PRESENT ILLNESS
[FreeTextEntry1] : Lorena is a 56-year-old female referred for newly diagnosed ductal carcinoma in situ of the right breast. Her recent bilateral breast MRI done for cancer surveillance (12/27/2024, [facility]). showed an indeterminant clumped linear non-mass enhancement in the right outer breast for which an MRI guided biopsy was recommended. Her most recent screening imaging done on 05/03/2024 ([facility]) showed heterogeneously dense breast tissue with no suspicious findings on neither mammogram nor ultrasound.  Pathology of right 9:00 outer MRI biopsy ([facility], 01/17/2025) showed DCIS, intermediate grade ER negative and DE negative.  Her previous breast history is positive for right invasive ductal carcinoma in 2012. This was an incidental finding of an 8mm IDC on pathology of a right excisional biopsy done for a papilloma. Receptors were ER/DE positive and her2 negative She underwent a right re-excision partial mastectomy and right sentinel node excision (Dr Jocelynn Guerrier) which showed no residual carcinoma and 0/3 sentinel nodes.  Her oncotype recurrence score was 19 She underwent RTX and 10 years on tamoxifen therapy (Dr Carri Bernabe). 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 She consulted with Dr Kelley who ordered a CT of chest, abd, and pelvis and bone scan.   CT of Ch, Abd, Pelvis (01/29/2025, [facility]) showed small 0.3 cm nodule in the apex of right lung, felt to be likely inflammatory in nature. a 1.5 cm region of sclerosis adjacent to L4 vertebral body endplate. Appears stable since MRI 3/2022 which bone scan (02/06/2025, [facility]) showing no evidence of bony metastatic disease. No follow up imaging was recommended.   Pt is s/p bilateral nipple delay on 02/13/2025. Surgical pathology of right nipple showed benign breast tissue. Surgical pathology of left nipple showed benign breast tissue.   She is s/p bilateral nipple sparing mastectomy with LIZ flap reconstruction on 3/5/2025. Final pathology reported as right DCIS 4mm with focal lobular extension and left benign breast tissue.   She presents for her post op appt. She is feeling well.   She does SBE. She has not noticed a change in her breast or a breast lump. She has not noticed a change in her nipple or nipple area. She has not noticed a change in the skin of the breast. She is not experiencing nipple discharge. She is not experiencing breast pain. She has not noticed a lump or lymph node under the armpit.   BREAST CANCER RISK FACTORS Menarche: 12 Date of LMP: 2022 Menopause: 54 Grav: 3     Para: 2 Age at first live birth: 31 Nursed: yes Hysterectomy: no Oophorectomy: no OCP: yes 10 years break back on  HRT: no Last pap/pelvic exam: 12/10/2024 WNL Related family history: Pat aunt x 2 unsure of age  Ashkenazi: no Mastery risk assessment: n/a Genetic testing: VUS POLE updated testing 2023 Bra size:36 B   Last mammogram:  02/07/2025                          Location: [facility] Report reviewed.                                 Images reviewed. Results: Birads 6  Heterogeneously dense, known biopsy proven malignancy    Last ultrasound:    05/03/2024                               Location: [facility] Report reviewed.                                 Images reviewed. Results: Birads 2 No evidence of malignancy.   Last MRI: 12/27/2024                                            Location: [facility] Report reviewed. Results: Birads 4 Right outer breast indeterminant clumped linear non-mass enhancement. Rec: MRI guided biopsy

## 2025-04-01 NOTE — HISTORY OF PRESENT ILLNESS
[FreeTextEntry1] : Lorena is a 56-year-old female referred for newly diagnosed ductal carcinoma in situ of the right breast. Her recent bilateral breast MRI done for cancer surveillance (12/27/2024, [facility]). showed an indeterminant clumped linear non-mass enhancement in the right outer breast for which an MRI guided biopsy was recommended. Her most recent screening imaging done on 05/03/2024 ([facility]) showed heterogeneously dense breast tissue with no suspicious findings on neither mammogram nor ultrasound.  Pathology of right 9:00 outer MRI biopsy ([facility], 01/17/2025) showed DCIS, intermediate grade ER negative and MT negative.  Her previous breast history is positive for right invasive ductal carcinoma in 2012. This was an incidental finding of an 8mm IDC on pathology of a right excisional biopsy done for a papilloma. Receptors were ER/MT positive and her2 negative She underwent a right re-excision partial mastectomy and right sentinel node excision (Dr Jocelynn Guerrier) which showed no residual carcinoma and 0/3 sentinel nodes.  Her oncotype recurrence score was 19 She underwent RTX and 10 years on tamoxifen therapy (Dr Carri Bernabe). 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 She consulted with Dr Kelley who ordered a CT of chest, abd, and pelvis and bone scan.   CT of Ch, Abd, Pelvis (01/29/2025, [facility]) showed small 0.3 cm nodule in the apex of right lung, felt to be likely inflammatory in nature. a 1.5 cm region of sclerosis adjacent to L4 vertebral body endplate. Appears stable since MRI 3/2022 which bone scan (02/06/2025, [facility]) showing no evidence of bony metastatic disease. No follow up imaging was recommended.   Pt is s/p bilateral nipple delay on 02/13/2025. Surgical pathology of right nipple showed benign breast tissue. Surgical pathology of left nipple showed benign breast tissue.   She is s/p bilateral nipple sparing mastectomy with LIZ flap reconstruction on 3/5/2025. Final pathology reported as right DCIS 4mm with focal lobular extension and left benign breast tissue.   She presents for her post op appt. She is feeling well.   She does SBE. She has not noticed a change in her breast or a breast lump. She has not noticed a change in her nipple or nipple area. She has not noticed a change in the skin of the breast. She is not experiencing nipple discharge. She is not experiencing breast pain. She has not noticed a lump or lymph node under the armpit.   BREAST CANCER RISK FACTORS Menarche: 12 Date of LMP: 2022 Menopause: 54 Grav: 3     Para: 2 Age at first live birth: 31 Nursed: yes Hysterectomy: no Oophorectomy: no OCP: yes 10 years break back on  HRT: no Last pap/pelvic exam: 12/10/2024 WNL Related family history: Pat aunt x 2 unsure of age  Ashkenazi: no Mastery risk assessment: n/a Genetic testing: VUS POLE updated testing 2023 Bra size:36 B   Last mammogram:  02/07/2025                          Location: [facility] Report reviewed.                                 Images reviewed. Results: Birads 6  Heterogeneously dense, known biopsy proven malignancy    Last ultrasound:    05/03/2024                               Location: [facility] Report reviewed.                                 Images reviewed. Results: Birads 2 No evidence of malignancy.   Last MRI: 12/27/2024                                            Location: [facility] Report reviewed. Results: Birads 4 Right outer breast indeterminant clumped linear non-mass enhancement. Rec: MRI guided biopsy

## 2025-05-19 NOTE — HISTORY OF PRESENT ILLNESS
[FreeTextEntry1] : 57 y/o presents s/p bilateral LIZ flap reconstruction on 03/05/2025. Denies f/c/n/v, no complaints, feels well.   B/L breasts soft, incisions well healed, flaps warm with good color and capillary refill NACs viable, no infections or collections Abdomen soft, non-tender, non-distended, incision healing well, umbo viable    Plan: Now ready for planned 2nd stage of reconstruction. Will plan for b/l breast reconstruction revision, fat grafting, and abdominal donor site revision. Nature of surgery, its risks, benefits, alternatives, expected postoperative course, recovery and long term results were reviewed. All questions answered. Ms. AUGUSTIN expressed understanding and agreed to proceed. Will coordinate surgery for the near future.

## 2025-05-19 NOTE — SURGICAL HISTORY
[de-identified] : 03/05/25: bilateral LIZ flap reconstruction with reinforcement of abdominal donor site with mesh and axogen nerve grafting

## 2025-07-09 NOTE — SURGICAL HISTORY
[de-identified] : 03/05/25: bilateral LIZ flap reconstruction with reinforcement of abdominal donor site with mesh and axogen nerve grafting [de-identified] : 07/03/25: bilateral breast reconstruction revision, breast fat grafting and abdomen donor site revision

## 2025-07-09 NOTE — HISTORY OF PRESENT ILLNESS
[FreeTextEntry1] : 58 y/o presents s/p bilateral breast reconstruction revision, fat grafting to B/L breasts and abdominal donor site revision on 07/03/2025. She denies f/v/n/c, reports pain is controlled with Tylenol prn.   B/L breasts are soft, incisions c/d/i, good contour and symmetry Abdomen soft, non-tender, non-distended, B/L incisions c/d/i good contour  No infections or collections   Plan: -post op instructions reviewed  -shower regularly -activity restrictions reviewed -aquaphor to incisions when steris fall off  -f/u in 6 months

## 2025-07-09 NOTE — SURGICAL HISTORY
[de-identified] : 03/05/25: bilateral LIZ flap reconstruction with reinforcement of abdominal donor site with mesh and axogen nerve grafting [de-identified] : 07/03/25: bilateral breast reconstruction revision, breast fat grafting and abdomen donor site revision

## 2025-07-09 NOTE — HISTORY OF PRESENT ILLNESS
[FreeTextEntry1] : 56 y/o presents s/p bilateral breast reconstruction revision, fat grafting to B/L breasts and abdominal donor site revision on 07/03/2025. She denies f/v/n/c, reports pain is controlled with Tylenol prn.   B/L breasts are soft, incisions c/d/i, good contour and symmetry Abdomen soft, non-tender, non-distended, B/L incisions c/d/i good contour  No infections or collections   Plan: -post op instructions reviewed  -shower regularly -activity restrictions reviewed -aquaphor to incisions when steris fall off  -f/u in 6 months

## 2025-07-09 NOTE — SURGICAL HISTORY
[de-identified] : 03/05/25: bilateral LIZ flap reconstruction with reinforcement of abdominal donor site with mesh and axogen nerve grafting [de-identified] : 07/03/25: bilateral breast reconstruction revision, breast fat grafting and abdomen donor site revision

## 2025-07-15 NOTE — SURGICAL HISTORY
[de-identified] : 03/05/25: bilateral LIZ flap reconstruction with reinforcement of abdominal donor site with mesh and axogen nerve grafting [de-identified] : 07/03/25: bilateral breast reconstruction revision, breast fat grafting and abdomen donor site revision

## 2025-07-15 NOTE — HISTORY OF PRESENT ILLNESS
[FreeTextEntry1] : 58 y/o presents for a left abdominal incision site check, s/p bilateral breast reconstruction revision, fat grafting to B/L breasts and abdominal donor site revision on 07/03/2025.   B/L breasts are soft, incisions are healing well, good contour and symmetry Abdomen soft, non-tender, non-distended, right incision is healing well, left incision with minor step-off good contour  No infections or collections   Plan: -no intervention needed for left abdominal incision step-off, will reassess healing at her next visit -f/u in 6 months